# Patient Record
Sex: MALE | Race: WHITE | Employment: FULL TIME | URBAN - METROPOLITAN AREA
[De-identification: names, ages, dates, MRNs, and addresses within clinical notes are randomized per-mention and may not be internally consistent; named-entity substitution may affect disease eponyms.]

---

## 2017-09-27 ENCOUNTER — OFFICE VISIT (OUTPATIENT)
Dept: ENT CLINIC | Age: 67
End: 2017-09-27

## 2017-09-27 VITALS
BODY MASS INDEX: 36.88 KG/M2 | OXYGEN SATURATION: 98 % | DIASTOLIC BLOOD PRESSURE: 83 MMHG | HEART RATE: 68 BPM | HEIGHT: 67 IN | WEIGHT: 235 LBS | SYSTOLIC BLOOD PRESSURE: 128 MMHG

## 2017-09-27 DIAGNOSIS — J34.89 NASAL OBSTRUCTION: Primary | ICD-10-CM

## 2017-09-27 PROCEDURE — 99203 OFFICE O/P NEW LOW 30 MIN: CPT | Performed by: OTOLARYNGOLOGY

## 2017-09-27 RX ORDER — METOPROLOL SUCCINATE 50 MG/1
50 TABLET, EXTENDED RELEASE ORAL DAILY
COMMUNITY

## 2017-09-27 RX ORDER — AMLODIPINE BESYLATE 5 MG/1
5 TABLET ORAL DAILY
COMMUNITY

## 2017-09-27 RX ORDER — LISINOPRIL 40 MG/1
40 TABLET ORAL DAILY
COMMUNITY

## 2017-10-09 ENCOUNTER — TELEPHONE (OUTPATIENT)
Dept: ENT CLINIC | Age: 67
End: 2017-10-09

## 2017-10-10 ENCOUNTER — TELEPHONE (OUTPATIENT)
Dept: ENT CLINIC | Age: 67
End: 2017-10-10

## 2017-10-13 ENCOUNTER — OFFICE VISIT (OUTPATIENT)
Dept: ENT CLINIC | Age: 67
End: 2017-10-13

## 2017-10-13 ENCOUNTER — TELEPHONE (OUTPATIENT)
Dept: ENT CLINIC | Age: 67
End: 2017-10-13

## 2017-10-13 VITALS
SYSTOLIC BLOOD PRESSURE: 129 MMHG | DIASTOLIC BLOOD PRESSURE: 80 MMHG | HEART RATE: 69 BPM | BODY MASS INDEX: 36.88 KG/M2 | WEIGHT: 235 LBS | HEIGHT: 67 IN

## 2017-10-13 DIAGNOSIS — J34.89 NASAL OBSTRUCTION: Primary | ICD-10-CM

## 2017-10-13 PROCEDURE — 31231 NASAL ENDOSCOPY DX: CPT | Performed by: OTOLARYNGOLOGY

## 2017-10-13 NOTE — PROGRESS NOTES
Returns for scoping after initial clinical exam and radiographs  Continued right sided nasal obstruction exacerbated by the need to lay on his left side due to sciatic issues    Scan from outside radiology/images were reviewed    Patient was placed in sitting position  The nose was vasoconstricted oxymetazoline and anesthetized with Cetacaine  The anterior septum is off the premaxillary crest having vacated toward the right side.   The middle turbinate can be visualized on the right but minimally  The scope could be maneuvered through the superior portion nasal vault but not the inferior portion given spurring here  No purulence was seen  The ostiomeatal units appeared clear    We discussed the ramifications of septal reconstruction  He is aware this will require packing, there will be postoperative swelling  He will avoid any aspirin products and consider this on an elective basis

## 2017-10-30 ENCOUNTER — HOSPITAL ENCOUNTER (OUTPATIENT)
Dept: SURGERY | Age: 67
Discharge: HOME | End: 2017-10-30
Attending: OTOLARYNGOLOGY | Admitting: OTOLARYNGOLOGY

## 2017-10-30 VITALS
RESPIRATION RATE: 15 BRPM | OXYGEN SATURATION: 96 % | TEMPERATURE: 97.6 F | BODY MASS INDEX: 37.05 KG/M2 | HEIGHT: 67 IN | HEART RATE: 88 BPM | SYSTOLIC BLOOD PRESSURE: 156 MMHG | DIASTOLIC BLOOD PRESSURE: 76 MMHG | WEIGHT: 236.06 LBS

## 2017-10-30 LAB
ANION GAP SERPL CALCULATED.3IONS-SCNC: 14 MMOL/L (ref 3–16)
BUN BLDV-MCNC: 21 MG/DL (ref 7–20)
CALCIUM SERPL-MCNC: 9 MG/DL (ref 8.3–10.6)
CHLORIDE BLD-SCNC: 97 MMOL/L (ref 99–110)
CO2: 25 MMOL/L (ref 21–32)
CREAT SERPL-MCNC: 1.2 MG/DL (ref 0.8–1.3)
GFR AFRICAN AMERICAN: >60
GFR NON-AFRICAN AMERICAN: >60
GLUCOSE BLD-MCNC: 120 MG/DL (ref 70–99)
HCT VFR BLD CALC: 42.9 % (ref 40.5–52.5)
HEMOGLOBIN: 15 G/DL (ref 13.5–17.5)
MCH RBC QN AUTO: 33.4 PG (ref 26–34)
MCHC RBC AUTO-ENTMCNC: 35 G/DL (ref 31–36)
MCV RBC AUTO: 95.6 FL (ref 80–100)
PDW BLD-RTO: 13.7 % (ref 12.4–15.4)
PLATELET # BLD: 260 K/UL (ref 135–450)
PMV BLD AUTO: 8.3 FL (ref 5–10.5)
POTASSIUM SERPL-SCNC: 4.3 MMOL/L (ref 3.5–5.1)
RBC # BLD: 4.49 M/UL (ref 4.2–5.9)
SODIUM BLD-SCNC: 136 MMOL/L (ref 136–145)
WBC # BLD: 10 K/UL (ref 4–11)

## 2017-10-30 PROCEDURE — 30130 EXCISE INFERIOR TURBINATE: CPT | Performed by: OTOLARYNGOLOGY

## 2017-10-30 PROCEDURE — 30520 REPAIR OF NASAL SEPTUM: CPT | Performed by: OTOLARYNGOLOGY

## 2017-10-30 RX ORDER — SODIUM CHLORIDE 0.9 % (FLUSH) 0.9 %
10 SYRINGE (ML) INJECTION PRN
Status: DISCONTINUED | OUTPATIENT
Start: 2017-10-30 | End: 2017-10-31 | Stop reason: HOSPADM

## 2017-10-30 RX ORDER — ACETAMINOPHEN 325 MG/1
650 TABLET ORAL EVERY 4 HOURS PRN
Status: CANCELLED | OUTPATIENT
Start: 2017-10-30

## 2017-10-30 RX ORDER — LABETALOL HYDROCHLORIDE 5 MG/ML
5 INJECTION, SOLUTION INTRAVENOUS EVERY 10 MIN PRN
Status: DISCONTINUED | OUTPATIENT
Start: 2017-10-30 | End: 2017-10-31 | Stop reason: HOSPADM

## 2017-10-30 RX ORDER — LIDOCAINE HYDROCHLORIDE 10 MG/ML
0.5 INJECTION, SOLUTION EPIDURAL; INFILTRATION; INTRACAUDAL; PERINEURAL ONCE
Status: DISCONTINUED | OUTPATIENT
Start: 2017-10-30 | End: 2017-10-31 | Stop reason: HOSPADM

## 2017-10-30 RX ORDER — OXYCODONE HYDROCHLORIDE AND ACETAMINOPHEN 5; 325 MG/1; MG/1
2 TABLET ORAL PRN
Status: COMPLETED | OUTPATIENT
Start: 2017-10-30 | End: 2017-10-30

## 2017-10-30 RX ORDER — SODIUM CHLORIDE, SODIUM LACTATE, POTASSIUM CHLORIDE, CALCIUM CHLORIDE 600; 310; 30; 20 MG/100ML; MG/100ML; MG/100ML; MG/100ML
INJECTION, SOLUTION INTRAVENOUS CONTINUOUS
Status: DISCONTINUED | OUTPATIENT
Start: 2017-10-30 | End: 2017-10-31 | Stop reason: HOSPADM

## 2017-10-30 RX ORDER — SODIUM CHLORIDE 0.9 % (FLUSH) 0.9 %
10 SYRINGE (ML) INJECTION PRN
Status: CANCELLED | OUTPATIENT
Start: 2017-10-30

## 2017-10-30 RX ORDER — FENTANYL CITRATE 50 UG/ML
50 INJECTION, SOLUTION INTRAMUSCULAR; INTRAVENOUS EVERY 5 MIN PRN
Status: DISCONTINUED | OUTPATIENT
Start: 2017-10-30 | End: 2017-10-31 | Stop reason: HOSPADM

## 2017-10-30 RX ORDER — HYDROMORPHONE HCL 110MG/55ML
0.25 PATIENT CONTROLLED ANALGESIA SYRINGE INTRAVENOUS EVERY 5 MIN PRN
Status: DISCONTINUED | OUTPATIENT
Start: 2017-10-30 | End: 2017-10-31 | Stop reason: HOSPADM

## 2017-10-30 RX ORDER — TRAMADOL HYDROCHLORIDE 50 MG/1
50 TABLET ORAL EVERY 6 HOURS PRN
Qty: 15 TABLET | Refills: 0 | Status: SHIPPED | OUTPATIENT
Start: 2017-10-30 | End: 2017-11-09

## 2017-10-30 RX ORDER — OXYCODONE HYDROCHLORIDE AND ACETAMINOPHEN 5; 325 MG/1; MG/1
1 TABLET ORAL PRN
Status: COMPLETED | OUTPATIENT
Start: 2017-10-30 | End: 2017-10-30

## 2017-10-30 RX ORDER — HYDROMORPHONE HCL 110MG/55ML
0.5 PATIENT CONTROLLED ANALGESIA SYRINGE INTRAVENOUS EVERY 5 MIN PRN
Status: DISCONTINUED | OUTPATIENT
Start: 2017-10-30 | End: 2017-10-31 | Stop reason: HOSPADM

## 2017-10-30 RX ORDER — MEPERIDINE HYDROCHLORIDE 25 MG/ML
12.5 INJECTION INTRAMUSCULAR; INTRAVENOUS; SUBCUTANEOUS EVERY 5 MIN PRN
Status: DISCONTINUED | OUTPATIENT
Start: 2017-10-30 | End: 2017-10-31 | Stop reason: HOSPADM

## 2017-10-30 RX ORDER — FENTANYL CITRATE 50 UG/ML
25 INJECTION, SOLUTION INTRAMUSCULAR; INTRAVENOUS EVERY 5 MIN PRN
Status: DISCONTINUED | OUTPATIENT
Start: 2017-10-30 | End: 2017-10-31 | Stop reason: HOSPADM

## 2017-10-30 RX ORDER — SODIUM CHLORIDE 0.9 % (FLUSH) 0.9 %
10 SYRINGE (ML) INJECTION EVERY 12 HOURS SCHEDULED
Status: CANCELLED | OUTPATIENT
Start: 2017-10-30

## 2017-10-30 RX ORDER — ONDANSETRON 2 MG/ML
4 INJECTION INTRAMUSCULAR; INTRAVENOUS EVERY 6 HOURS PRN
Status: CANCELLED | OUTPATIENT
Start: 2017-10-30

## 2017-10-30 RX ORDER — LIDOCAINE HYDROCHLORIDE 10 MG/ML
1 INJECTION, SOLUTION EPIDURAL; INFILTRATION; INTRACAUDAL; PERINEURAL
Status: ACTIVE | OUTPATIENT
Start: 2017-10-30 | End: 2017-10-30

## 2017-10-30 RX ORDER — ONDANSETRON 2 MG/ML
4 INJECTION INTRAMUSCULAR; INTRAVENOUS
Status: ACTIVE | OUTPATIENT
Start: 2017-10-30 | End: 2017-10-30

## 2017-10-30 RX ORDER — SODIUM CHLORIDE 9 MG/ML
INJECTION, SOLUTION INTRAVENOUS CONTINUOUS
Status: DISCONTINUED | OUTPATIENT
Start: 2017-10-30 | End: 2017-10-31 | Stop reason: HOSPADM

## 2017-10-30 RX ORDER — OXYMETAZOLINE HYDROCHLORIDE 0.05 G/100ML
1 SPRAY NASAL ONCE
Status: COMPLETED | OUTPATIENT
Start: 2017-10-30 | End: 2017-10-30

## 2017-10-30 RX ORDER — SODIUM CHLORIDE 0.9 % (FLUSH) 0.9 %
10 SYRINGE (ML) INJECTION EVERY 12 HOURS SCHEDULED
Status: DISCONTINUED | OUTPATIENT
Start: 2017-10-30 | End: 2017-10-31 | Stop reason: HOSPADM

## 2017-10-30 RX ADMIN — Medication 0.25 MG: at 10:52

## 2017-10-30 RX ADMIN — OXYCODONE HYDROCHLORIDE AND ACETAMINOPHEN 1 TABLET: 5; 325 TABLET ORAL at 11:28

## 2017-10-30 RX ADMIN — OXYMETAZOLINE HYDROCHLORIDE 1 SPRAY: 0.05 SPRAY NASAL at 07:55

## 2017-10-30 RX ADMIN — Medication 0.25 MG: at 10:39

## 2017-10-30 RX ADMIN — SODIUM CHLORIDE, SODIUM LACTATE, POTASSIUM CHLORIDE, CALCIUM CHLORIDE: 600; 310; 30; 20 INJECTION, SOLUTION INTRAVENOUS at 07:55

## 2017-10-30 RX ADMIN — Medication 0.25 MG: at 10:33

## 2017-10-30 ASSESSMENT — PAIN SCALES - GENERAL
PAINLEVEL_OUTOF10: 5
PAINLEVEL_OUTOF10: 3
PAINLEVEL_OUTOF10: 5
PAINLEVEL_OUTOF10: 2
PAINLEVEL_OUTOF10: 5

## 2017-10-30 ASSESSMENT — PAIN DESCRIPTION - LOCATION: LOCATION: NOSE

## 2017-10-30 ASSESSMENT — PAIN DESCRIPTION - PAIN TYPE
TYPE: SURGICAL PAIN

## 2017-10-30 ASSESSMENT — PAIN - FUNCTIONAL ASSESSMENT: PAIN_FUNCTIONAL_ASSESSMENT: 0-10

## 2017-10-30 NOTE — PROGRESS NOTES
Discharge instructions reviewed and understanding verbalized per pt/family with copy given. All home medications/new prescriptions have been reviewed, questions answered and patient/family state understanding. Medication information sheet provided for new prescriptions received, script for Ultram sent home with pt, prefers to fill at own pharmacy. Pt wife at bedside to listen to instructions and transport pt home. Pt tolerating PO fluids well, pain well controlled 2/10, VSS and oxygen saturation 97% on room air. No nausea reported. Pt states he is ready to go home. Pt seen per anesthesia and meets criteria for discharge. Pt sent home with dressing change materials. IV d/c pressure applied and no bleeding at site.

## 2017-10-30 NOTE — ANESTHESIA PRE-OP
[address] Anesthesiology  Pre-Anesthesia Evaluation/Consultation       Name:  Cece Ann                                         Age:  78 y.o. MRN:    [id number]           Procedure (Scheduled): SEPTOPLASTY ETHMOIDECTOMY TURBINECTOMY  Surgeon:     Dr. Sharita Hilton MD     No Known Allergies  Patient Active Problem List   Diagnosis    Nasal obstruction     Past Medical History:   Diagnosis Date    Hypertension      Past Surgical History:   Procedure Laterality Date    ANKLE SURGERY Right     BACK SURGERY      X2    COLONOSCOPY       Social History   Substance Use Topics    Smoking status: Never Smoker    Smokeless tobacco: Never Used    Alcohol use Yes      Comment: rare       Prior to Admission medications    Medication Sig Start Date End Date Taking?  Authorizing Provider   UNABLE TO FIND Place 6 drops under the tongue daily Hemp oil-over the counter 10/16/17  Yes Historical Provider, MD   metoprolol succinate (TOPROL XL) 50 MG extended release tablet Take 50 mg by mouth daily   Yes Historical Provider, MD   lisinopril (PRINIVIL;ZESTRIL) 40 MG tablet Take 40 mg by mouth daily   Yes Historical Provider, MD   amLODIPine (NORVASC) 5 MG tablet Take 5 mg by mouth daily   Yes Historical Provider, MD   meloxicam (MOBIC) 15 MG tablet Take 15 mg by mouth daily as needed  5/1/17   Historical Provider, MD       Current Outpatient Prescriptions   Medication Sig Dispense Refill    UNABLE TO FIND Place 6 drops under the tongue daily Hemp oil-over the counter      metoprolol succinate (TOPROL XL) 50 MG extended release tablet Take 50 mg by mouth daily      lisinopril (PRINIVIL;ZESTRIL) 40 MG tablet Take 40 mg by mouth daily      amLODIPine (NORVASC) 5 MG tablet Take 5 mg by mouth daily      meloxicam (MOBIC) 15 MG tablet Take 15 mg by mouth daily as needed        Current Facility-Administered Medications   Medication Dose Route Frequency Provider Last Rate Last Dose    lactated ringers infusion Intravenous Continuous Rochelle Feliciano MD        lidocaine PF 1 % injection 0.5 mL  0.5 mL Intradermal Once Rochelle Feliciano MD        oxymetazoline (AFRIN) 0.05 % nasal spray 1 spray  1 spray Each Nare Once Rochelle Feliciano MD        0.9 % sodium chloride infusion   Intravenous Continuous Ishan Ash MD        sodium chloride flush 0.9 % injection 10 mL  10 mL Intravenous 2 times per day Ishan Ash MD        sodium chloride flush 0.9 % injection 10 mL  10 mL Intravenous PRN Ishan Ash MD        lidocaine PF 1 % injection 1 mL  1 mL Intradermal Once JACINTAN Ishan Ash MD        HYDROmorphone (DILAUDID) injection 0.25 mg  0.25 mg Intravenous Q5 Min PRN Ishan Ash MD        HYDROmorphone (DILAUDID) injection 0.5 mg  0.5 mg Intravenous Q5 Min PRN Ishan Ash MD        fentaNYL (SUBLIMAZE) injection 25 mcg  25 mcg Intravenous Q5 Min PRN Ishan Ash MD        fentaNYL (SUBLIMAZE) injection 50 mcg  50 mcg Intravenous Q5 Min PRN Ishan Ash MD        oxyCODONE-acetaminophen (PERCOCET) 5-325 MG per tablet 1 tablet  1 tablet Oral PRN Ishan Ash MD        Or    oxyCODONE-acetaminophen (PERCOCET) 5-325 MG per tablet 2 tablet  2 tablet Oral PRN Ishan Ash MD        [facility]dansetron [facility] injection 4 mg  4 mg Intravenous Once PRN Ishan Ash MD        labetalol (NORMODYNE;TRANDATE) injection 5 mg  5 mg Intravenous Q10 Min PRN Ishan Ash MD        meperidine (DEMEROL) injection 12.5 mg  12.5 mg Intravenous Q5 Min PRN Ishan Ash MD           Vital Signs  (Current)   Vitals:    10/30/17 0728   BP: (!) 140/91   Pulse: 78   Resp: 16   Temp: 97.8 °F (36.6 °C)   SpO2: 99%     (for past 48 hrs)  Temp  Av.8 °F (36.6 °C)  Min: 97.8 °F (36.6 °C)   Min taken time: 10/30/17 0728  Max: 97.8 °F (36.6 °C)   Max taken time: 10/30/17 0728  Pulse  Av  Min: 66   Min taken time: 10/30/17 0728  Max: 78   Max taken time: 10/30/17 0728  Resp  Av  Min: 16   Min taken time: 10/30/17 0728  Max: 16   Max taken time: 10/30/17 0728  BP  Min: 140/91 Min taken time: 10/30/17 0728  Max: 140/91   Max taken time: 10/30/17 0728  SpO2  Av %  Min: 99 %   Min taken time: 10/30/17 0728  Max: 99 %   Max taken time: 10/30/17 0728  (last three values)   BP Readings from Last 3 Encounters:   10/30/17 (!) 140/91   10/13/17 129/80   17 128/83       CBC  No results found for: WBC, RBC, HGB, HCT, MCV, RDW, PLT    CMP  No results found for: NA, K, CL, CO2, BUN, CREATININE, GFRAA, AGRATIO, LABGLOM, GLUCOSE, PROT, CALCIUM, BILITOT, ALKPHOS, AST, ALT    BMP  No results found for: NA, K, CL, CO2, BUN, CREATININE, CALCIUM, GFRAA, LABGLOM, GLUCOSE    Coags   No results found for: PROTIME, INR, APTT    HCG (If Applicable) No results found for: PREGTESTUR, PREGSERUM, HCG, HCGQUANT     ABGs  No results found for: PHART, PO2ART, IUR1WEH, USU5ZXZ, BEART, F9XNRNTJ     Type & Screen (If Applicable)  No results found for: LABABO, LABRH      POCGlucose  No results for input(s): GLUCOSE in the last 72 hours. NPO Status  > 8 hours                       BMI  Body mass index is 36.97 kg/m². Estimated body mass index is 36.97 kg/m² as calculated from the following:    Height as of this encounter: 5' 7\" (1.702 m). Weight as of this encounter: 236 lb 1 oz (107.1 kg).       Additional Testing (Echo, Stress, ECG, PFTs, etc)        Anesthesia Evaluation  Patient summary reviewed and Nursing notes reviewed  Airway: Mallampati: III  TM distance: >3 FB   Neck ROM: full  Mouth opening: < 3 FB Dental: normal exam         Pulmonary:negative ROS and normal exam  breath sounds clear to auscultation                             Cardiovascular:  Exercise tolerance: good (>4 METS),   (+) hypertension: no interval change,         Rhythm: regular  Rate: normal           Beta Blocker:  Dose within 24 Hrs         Neuro/Psych:   {neg ROS              GI/Hepatic/Renal: neg ROS       (-) GERD and hepatitis       Endo/Other:    (+) : arthritis: OA.    (-) hyperthyroidism, no Type II DM

## 2017-10-30 NOTE — H&P
(TOPROL XL) 50 MG extended release tablet Take 50 mg by mouth daily      lisinopril (PRINIVIL;ZESTRIL) 40 MG tablet Take 40 mg by mouth daily      amLODIPine (NORVASC) 5 MG tablet Take 5 mg by mouth daily      meloxicam (MOBIC) 15 MG tablet Take 15 mg by mouth daily as needed        No current facility-administered medications on file prior to encounter. Allergies:  No Known Allergies     Social History:   reports that he has never smoked. He has never used smokeless tobacco. He reports that he drinks alcohol. He reports that he does not use drugs. Family History:  Dad  from MI and etoh abuse   Brother with CABG and MI in his 42's ( heavy smoker)    Physical Exam:  BP (!) 140/91   Pulse 78   Temp 97.8 °F (36.6 °C) (Temporal)   Resp 16   Ht 5' 7\" (1.702 m)   Wt 236 lb 1 oz (107.1 kg)   SpO2 99%   BMI 36.97 kg/m²     General appearance:  Appears comfortable. Well nourished, he is obese   Eyes: Sclera clear, pupils equal  ENT: Moist mucus membranes, no thrush. Trachea midline. Cardiovascular: Regular rhythm, normal S1, S2. No murmur, gallop, rub. No edema in lower extremities  Respiratory: Clear to auscultation bilaterally, no wheeze, good inspiratory effort  Gastrointestinal: Abdomen soft, non-tender, not distended, normal bowel sounds  Musculoskeletal: No cyanosis in digits, neck supple  Neurology: Cranial nerves grossly intact. Alert and oriented in time, place and person. No speech or motor deficits  Psychiatry: Appropriate affect.  Not agitated  Skin: Warm, dry, normal turgor, no rash    Labs:  CBC:   Lab Results   Component Value Date    WBC 10.0 10/30/2017    RBC 4.49 10/30/2017    HGB 15.0 10/30/2017    HCT 42.9 10/30/2017    MCV 95.6 10/30/2017    MCH 33.4 10/30/2017    MCHC 35.0 10/30/2017    RDW 13.7 10/30/2017     10/30/2017    MPV 8.3 10/30/2017     BMP:    Lab Results   Component Value Date     10/30/2017    K 4.3 10/30/2017    CL 97 10/30/2017    CO2 25 10/30/2017 BUN 21 10/30/2017    CREATININE 1.2 10/30/2017    CALCIUM 9.0 10/30/2017    GFRAA >60 10/30/2017    LABGLOM >60 10/30/2017    GLUCOSE 120 10/30/2017             Problem List  HTN  Deviated septum      Assessment & Recommendation:     1. HTN:  bp control is adequate   2. Deviate septum: for surgery today      Patient is medically optimized for surgery. Thank you for the opportunity to participate in the care of your patient.   Tosin Mendez CNP    10/30/2017 7:42 AM

## 2017-10-30 NOTE — BRIEF OP NOTE
Brief Postoperative Note    Colon Re  YOB: 1950  [id number]    Pre-operative Diagnosis: nasal obstruction due to deviated septum and turbinate dysfunction    Post-operative Diagnosis: Same    Procedure: septal reconstruction and partial right inferior turbinectomy    Anesthesia: General    Surgeons/Assistants: [place]    Estimated Blood Loss: less than 50     Complications: None    Specimens: Was Obtained: septal cartilage and bone for gross only    Findings: per op note    Electronically signed by Tobias Islas MD on 10/30/2017 at 10:04 AM

## 2017-10-31 ENCOUNTER — OFFICE VISIT (OUTPATIENT)
Dept: ENT CLINIC | Age: 67
End: 2017-10-31

## 2017-10-31 VITALS — BODY MASS INDEX: 36.65 KG/M2 | WEIGHT: 234 LBS | SYSTOLIC BLOOD PRESSURE: 141 MMHG | DIASTOLIC BLOOD PRESSURE: 81 MMHG

## 2017-10-31 DIAGNOSIS — J34.89 NASAL OBSTRUCTION: Primary | ICD-10-CM

## 2017-10-31 PROCEDURE — 99024 POSTOP FOLLOW-UP VISIT: CPT | Performed by: OTOLARYNGOLOGY

## 2017-10-31 NOTE — OP NOTE
[facility]                    [address] [facility], [address] [facility]                               OPERATIVE REPORT    PATIENT NAME: Aldo Llanos                       :             1950  MED REC NO:   [id number]                           ROOM:  ACCOUNT NO:   [de-identified]                           ADMISSION DATE:  10/30/2017  PROVIDER:     Austin Briseno MD    DATE OF PROCEDURE:  10/30/2017    PREOPERATIVE DIAGNOSES:  Nasal obstruction secondary to deviated nasal  septum and turbinate hypertrophy. POSTOPERATIVE DIAGNOSES:  Nasal obstruction secondary to deviated  nasal septum and turbinate hypertrophy. OPERATION PERFORMED:  Septal reconstruction and partial right inferior  turbinectomy. SURGEON:  Austin Briseno MD    ANESTHESIA:  General.    INDICATIONS:  The patient has had ongoing right-sided nasal  obstruction chronically. He had generally been able to tolerate this  positionally at night. With new sciatica problems; however, sleeping  in this desired position had become difficult at best with ongoing  right-sided obstruction leading to mouth breathing and subsequent  stridor. CT scanning had revealed a thickened mucosa throughout the  ostiomeatal units, but no polyps, tumor, or bone destruction. The  patient had undergone endoscopy in the office confirming the presence  of a C-shaped septum to the right, which had vacated the premaxillary  crest.    The patient was brought to the operating room and placed on the table  in a supine position. After general anesthesia was induced, an  endotracheal tube was placed, and the patient ventilated well  throughout the procedure. The eyes protected with tape. The table  placed in approximately 20 degrees of reverse Trendelenburg position. A 2 mL of 1% Xylocaine with 1:100,000 epinephrine was injected along  the planned incision line and then the nose cocainized with 4 mL of 4%  cocaine.   Time was allowed for adequate vasoconstriction. The patient  was draped such that the mid face was exposed. Following removal of the pledgets, rhinoscopy again confirmed the  presence of a C-shaped septum creating subtotal blockage to the right  nasal vault. A Jeremy incision was made on the left side and extended out to the  piriform aperture. Mucoperichondrium was elevated off of the  quadrangular cartilage, decussations___, and the periosteum off the floor of the  nose. This revealed the displacement of the cartilage again off of  the premaxillary crest.  A second vertical incision was made in the  cartilage with care taken to leave an adequate caudal and dorsal  strut. The mucoperichondrium was elevated on the right side as well. Descending portions of the cartilage removed by me through Milla  swivel knife. The spur inferiorly as well as bony excrescences posteriorly were  bluntly and sharply removed. The cartilage was scored to enable to  hang more freely in the midline. It was swung back in a swinging door  fashion. When the flaps were coapted it could be seen that there was  noticeable improvement of the right nasal vault. The posterior aspect  of the inferior turbinate remained hypertrophic. A small portion of  this inferiorly was trimmed. Specimens were sent for gross only. Hemostasis was achieved with topical vasoconstrictor. The Jeremy  incision was closed with interrupted 4-0 chromic simple suture. When  it was certain the pharynx was dry, the nose was packed with a narrow  Merocel pack inferiorly and a normal sized, non-tubed Merocel superior  to this. The strings were secured to the cheek. A cold compressor  was applied. The patient was then awakened, extubated, and taken to  the recovery room. He tolerated the procedure well.         Rafiq Munoz MD    D: 10/30/2017 10:59:18       T: 10/31/2017 0:28:10     [email or web address]  Job#: [id number]     Doc#: [id number]

## 2017-11-06 ENCOUNTER — OFFICE VISIT (OUTPATIENT)
Dept: ENT CLINIC | Age: 67
End: 2017-11-06

## 2017-11-06 VITALS
DIASTOLIC BLOOD PRESSURE: 70 MMHG | HEIGHT: 67 IN | SYSTOLIC BLOOD PRESSURE: 135 MMHG | WEIGHT: 235 LBS | HEART RATE: 69 BPM | BODY MASS INDEX: 36.88 KG/M2

## 2017-11-06 DIAGNOSIS — J34.89 NASAL OBSTRUCTION: Primary | ICD-10-CM

## 2017-11-06 PROCEDURE — 99024 POSTOP FOLLOW-UP VISIT: CPT | Performed by: OTOLARYNGOLOGY

## 2017-11-20 ENCOUNTER — OFFICE VISIT (OUTPATIENT)
Dept: ENT CLINIC | Age: 67
End: 2017-11-20

## 2017-11-20 VITALS
BODY MASS INDEX: 37.73 KG/M2 | WEIGHT: 240.4 LBS | HEART RATE: 64 BPM | HEIGHT: 67 IN | SYSTOLIC BLOOD PRESSURE: 111 MMHG | DIASTOLIC BLOOD PRESSURE: 69 MMHG

## 2017-11-20 DIAGNOSIS — J34.89 NASAL OBSTRUCTION: Primary | ICD-10-CM

## 2017-11-20 PROCEDURE — 99024 POSTOP FOLLOW-UP VISIT: CPT | Performed by: OTOLARYNGOLOGY

## 2017-11-20 NOTE — PROGRESS NOTES
The patient is now 3 weeks status post septoplasty and partial turbinectomy  He has been irrigating his nose and has had a patent nasal airway with less congestion    Today a minimal amount of debris and mucus was suctioned from the nose  There was immediate subjective improvement following this  The septum appeared to be in the midline and there was no suggestion of secondary infection or hematoma  A small granuloma at the premaxillary crest on the right side was cauterized with silver nitrate  He will continue to irrigate and return in 3 weeks

## 2017-12-01 ENCOUNTER — OFFICE VISIT (OUTPATIENT)
Dept: ENT CLINIC | Age: 67
End: 2017-12-01

## 2017-12-01 VITALS
HEIGHT: 67 IN | BODY MASS INDEX: 37.51 KG/M2 | DIASTOLIC BLOOD PRESSURE: 62 MMHG | HEART RATE: 64 BPM | SYSTOLIC BLOOD PRESSURE: 124 MMHG | WEIGHT: 239 LBS

## 2017-12-01 DIAGNOSIS — J34.89 NASAL OBSTRUCTION: Primary | ICD-10-CM

## 2017-12-01 PROCEDURE — 99024 POSTOP FOLLOW-UP VISIT: CPT | Performed by: OTOLARYNGOLOGY

## 2017-12-01 NOTE — PROGRESS NOTES
Long-term follow-up after septoplasty for right-sided nasal obstruction  The patient is now very pleased with the airway on both sides of his nose  He no longer obstructs while sleeping which in turn has decreased his mouth breathing  The septum is healed in the midline and there is no abnormal scarring  The turbinates and sinus ostia are clear  We discussed hydration during the winter months, he may return as needed